# Patient Record
Sex: MALE | Race: WHITE | NOT HISPANIC OR LATINO | Employment: OTHER | URBAN - NONMETROPOLITAN AREA
[De-identification: names, ages, dates, MRNs, and addresses within clinical notes are randomized per-mention and may not be internally consistent; named-entity substitution may affect disease eponyms.]

---

## 2023-05-14 ENCOUNTER — HOSPITAL ENCOUNTER (EMERGENCY)
Facility: HOSPITAL | Age: 63
Discharge: HOME | End: 2023-05-14
Attending: EMERGENCY MEDICINE | Admitting: EMERGENCY MEDICINE
Payer: MEDICAID

## 2023-05-14 ENCOUNTER — APPOINTMENT (OUTPATIENT)
Dept: GENERAL RADIOLOGY | Facility: HOSPITAL | Age: 63
End: 2023-05-14
Payer: MEDICAID

## 2023-05-14 ENCOUNTER — APPOINTMENT (OUTPATIENT)
Dept: CT IMAGING | Facility: HOSPITAL | Age: 63
End: 2023-05-14
Payer: MEDICAID

## 2023-05-14 VITALS
WEIGHT: 225 LBS | DIASTOLIC BLOOD PRESSURE: 74 MMHG | BODY MASS INDEX: 28.88 KG/M2 | SYSTOLIC BLOOD PRESSURE: 146 MMHG | TEMPERATURE: 97.9 F | HEART RATE: 89 BPM | HEIGHT: 74 IN | OXYGEN SATURATION: 98 % | RESPIRATION RATE: 15 BRPM

## 2023-05-14 DIAGNOSIS — S76.312A STRAIN OF LEFT HAMSTRING MUSCLE, INITIAL ENCOUNTER: Primary | ICD-10-CM

## 2023-05-14 LAB
ALBUMIN SERPL-MCNC: 4.3 G/DL (ref 3.5–5.2)
ALBUMIN/GLOB SERPL: 1.2 G/DL
ALP SERPL-CCNC: 78 U/L (ref 39–117)
ALT SERPL W P-5'-P-CCNC: 43 U/L (ref 1–41)
ANION GAP SERPL CALCULATED.3IONS-SCNC: 12.8 MMOL/L (ref 5–15)
AST SERPL-CCNC: 26 U/L (ref 1–40)
BASOPHILS # BLD AUTO: 0.02 10*3/MM3 (ref 0–0.2)
BASOPHILS NFR BLD AUTO: 0.2 % (ref 0–1.5)
BILIRUB SERPL-MCNC: 1 MG/DL (ref 0–1.2)
BILIRUB UR QL STRIP: NEGATIVE
BUN SERPL-MCNC: 9 MG/DL (ref 8–23)
BUN/CREAT SERPL: 12.5 (ref 7–25)
CALCIUM SPEC-SCNC: 9.3 MG/DL (ref 8.6–10.5)
CHLORIDE SERPL-SCNC: 99 MMOL/L (ref 98–107)
CLARITY UR: CLEAR
CO2 SERPL-SCNC: 23.2 MMOL/L (ref 22–29)
COLOR UR: YELLOW
CREAT SERPL-MCNC: 0.72 MG/DL (ref 0.76–1.27)
CRP SERPL-MCNC: 7.84 MG/DL (ref 0–0.5)
DEPRECATED RDW RBC AUTO: 42 FL (ref 37–54)
EGFRCR SERPLBLD CKD-EPI 2021: 103.9 ML/MIN/1.73
EOSINOPHIL # BLD AUTO: 0.14 10*3/MM3 (ref 0–0.4)
EOSINOPHIL NFR BLD AUTO: 1.6 % (ref 0.3–6.2)
ERYTHROCYTE [DISTWIDTH] IN BLOOD BY AUTOMATED COUNT: 12.2 % (ref 12.3–15.4)
ERYTHROCYTE [SEDIMENTATION RATE] IN BLOOD: 11 MM/HR (ref 0–20)
GLOBULIN UR ELPH-MCNC: 3.5 GM/DL
GLUCOSE SERPL-MCNC: 158 MG/DL (ref 65–99)
GLUCOSE UR STRIP-MCNC: ABNORMAL MG/DL
HCT VFR BLD AUTO: 41.9 % (ref 37.5–51)
HGB BLD-MCNC: 14.4 G/DL (ref 13–17.7)
HGB UR QL STRIP.AUTO: NEGATIVE
IMM GRANULOCYTES # BLD AUTO: 0.02 10*3/MM3 (ref 0–0.05)
IMM GRANULOCYTES NFR BLD AUTO: 0.2 % (ref 0–0.5)
KETONES UR QL STRIP: NEGATIVE
LEUKOCYTE ESTERASE UR QL STRIP.AUTO: NEGATIVE
LYMPHOCYTES # BLD AUTO: 1.41 10*3/MM3 (ref 0.7–3.1)
LYMPHOCYTES NFR BLD AUTO: 15.7 % (ref 19.6–45.3)
MCH RBC QN AUTO: 32.3 PG (ref 26.6–33)
MCHC RBC AUTO-ENTMCNC: 34.4 G/DL (ref 31.5–35.7)
MCV RBC AUTO: 93.9 FL (ref 79–97)
MONOCYTES # BLD AUTO: 1 10*3/MM3 (ref 0.1–0.9)
MONOCYTES NFR BLD AUTO: 11.1 % (ref 5–12)
NEUTROPHILS NFR BLD AUTO: 6.38 10*3/MM3 (ref 1.7–7)
NEUTROPHILS NFR BLD AUTO: 71.2 % (ref 42.7–76)
NITRITE UR QL STRIP: NEGATIVE
NRBC BLD AUTO-RTO: 0 /100 WBC (ref 0–0.2)
PH UR STRIP.AUTO: 7 [PH] (ref 5–8)
PLATELET # BLD AUTO: 200 10*3/MM3 (ref 140–450)
PMV BLD AUTO: 9.5 FL (ref 6–12)
POTASSIUM SERPL-SCNC: 3.9 MMOL/L (ref 3.5–5.2)
PROT SERPL-MCNC: 7.8 G/DL (ref 6–8.5)
PROT UR QL STRIP: NEGATIVE
RBC # BLD AUTO: 4.46 10*6/MM3 (ref 4.14–5.8)
SODIUM SERPL-SCNC: 135 MMOL/L (ref 136–145)
SP GR UR STRIP: 1.01 (ref 1–1.03)
UROBILINOGEN UR QL STRIP: ABNORMAL
WBC NRBC COR # BLD: 8.97 10*3/MM3 (ref 3.4–10.8)

## 2023-05-14 PROCEDURE — 86140 C-REACTIVE PROTEIN: CPT

## 2023-05-14 PROCEDURE — 73502 X-RAY EXAM HIP UNI 2-3 VIEWS: CPT

## 2023-05-14 PROCEDURE — 72192 CT PELVIS W/O DYE: CPT

## 2023-05-14 PROCEDURE — 85652 RBC SED RATE AUTOMATED: CPT

## 2023-05-14 PROCEDURE — 85025 COMPLETE CBC W/AUTO DIFF WBC: CPT

## 2023-05-14 PROCEDURE — 80053 COMPREHEN METABOLIC PANEL: CPT

## 2023-05-14 PROCEDURE — 81003 URINALYSIS AUTO W/O SCOPE: CPT

## 2023-05-14 PROCEDURE — 99284 EMERGENCY DEPT VISIT MOD MDM: CPT

## 2023-05-14 RX ORDER — METHOCARBAMOL 500 MG/1
750 TABLET, FILM COATED ORAL ONCE
Status: COMPLETED | OUTPATIENT
Start: 2023-05-14 | End: 2023-05-14

## 2023-05-14 RX ORDER — LIDOCAINE 50 MG/G
1 PATCH TOPICAL EVERY 24 HOURS
Qty: 15 PATCH | Refills: 0 | Status: SHIPPED | OUTPATIENT
Start: 2023-05-14

## 2023-05-14 RX ORDER — METHOCARBAMOL 500 MG/1
500 TABLET, FILM COATED ORAL 4 TIMES DAILY
Qty: 15 TABLET | Refills: 0 | Status: SHIPPED | OUTPATIENT
Start: 2023-05-14

## 2023-05-14 RX ORDER — LIDOCAINE 50 MG/G
1 PATCH TOPICAL ONCE
Status: DISCONTINUED | OUTPATIENT
Start: 2023-05-14 | End: 2023-05-14 | Stop reason: HOSPADM

## 2023-05-14 RX ORDER — HYDROCODONE BITARTRATE AND ACETAMINOPHEN 5; 325 MG/1; MG/1
1 TABLET ORAL ONCE
Status: COMPLETED | OUTPATIENT
Start: 2023-05-14 | End: 2023-05-14

## 2023-05-14 RX ADMIN — LIDOCAINE 1 PATCH: 50 PATCH CUTANEOUS at 14:37

## 2023-05-14 RX ADMIN — METHOCARBAMOL 750 MG: 500 TABLET ORAL at 14:37

## 2023-05-14 RX ADMIN — HYDROCODONE BITARTRATE AND ACETAMINOPHEN 1 TABLET: 5; 325 TABLET ORAL at 14:36

## 2023-05-14 NOTE — ED PROVIDER NOTES
Subjective  History of Present Illness:    This is a 63-year-old male presents emergency room today history of diabetes hypertension hyperlipidemia who presents emergency room today for evaluation of left-sided back pain.  Does have a history of hip replacement several years ago.  Denies any overlying erythema warmth or swelling.  Denies any fevers.  Denies any IV drug use.  Denies any flank pain.  Denies any dysuria, hematuria, urinary frequency or urgency.  He denies any saddle anesthesias, urinary or fecal incontinence, or urinary retention.  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  He does report worse with movement and ambulation and that it does wax and wane and come and go.  He does report that he uses a ladder often for work.  He describes the pain as sharp, he did take some Tylenol yesterday, not much improvement in his symptoms.  Denies any nausea or vomiting.  Does report that the pain starts right above the left buttocks and wraps down slightly the lower extremity for radiation.      Nurses Notes reviewed and agree, including vitals, allergies, social history and prior medical history.     REVIEW OF SYSTEMS: All systems reviewed and not pertinent unless noted.  Review of Systems   Constitutional: Negative for fever.   Gastrointestinal: Negative for diarrhea, nausea and vomiting.   Musculoskeletal: Positive for back pain.   All other systems reviewed and are negative.      Past Medical History:   Diagnosis Date   • Diabetes mellitus    • Hyperlipidemia    • Hypertension        Allergies:    Patient has no known allergies.      History reviewed. No pertinent surgical history.      Social History     Socioeconomic History   • Marital status:    Tobacco Use   • Smoking status: Never   • Smokeless tobacco: Current     Types: Snuff   Vaping Use   • Vaping Use: Never used   Substance and Sexual Activity   • Alcohol use: Yes     Comment: social   • Drug  "use: Never   • Sexual activity: Defer         History reviewed. No pertinent family history.    Objective  Physical Exam:  /74 (BP Location: Left arm, Patient Position: Sitting)   Pulse 89   Temp 97.9 °F (36.6 °C) (Oral)   Resp 15   Ht 188 cm (74\")   Wt 102 kg (225 lb)   SpO2 98%   BMI 28.89 kg/m²      Physical Exam  Vitals and nursing note reviewed.   Constitutional:       General: He is not in acute distress.     Appearance: Normal appearance. He is normal weight. He is not ill-appearing, toxic-appearing or diaphoretic.   HENT:      Head: Normocephalic and atraumatic.      Nose: Nose normal. No congestion or rhinorrhea.      Mouth/Throat:      Pharynx: Oropharynx is clear.   Eyes:      Extraocular Movements: Extraocular movements intact.   Cardiovascular:      Pulses: Normal pulses.      Comments: Appears well perfused  Pulmonary:      Effort: Pulmonary effort is normal. No respiratory distress.   Abdominal:      General: Abdomen is flat.   Musculoskeletal:         General: Tenderness present. No swelling. Normal range of motion.      Cervical back: Normal range of motion and neck supple.      Comments: No cervical spine thoracic spine or lumbar spine tenderness.  There is tenderness palpated just at the left gluteal region.  No ASIS tenderness, no hip tenderness palpated.   Skin:     General: Skin is warm and dry.      Capillary Refill: Capillary refill takes less than 2 seconds.      Findings: No bruising, erythema or lesion.   Neurological:      General: No focal deficit present.      Mental Status: He is alert and oriented to person, place, and time.   Psychiatric:         Mood and Affect: Mood normal.         Behavior: Behavior normal.         Thought Content: Thought content normal.         Judgment: Judgment normal.             Procedures    ED Course:         Lab Results (last 24 hours)     Procedure Component Value Units Date/Time    Urinalysis With Culture If Indicated - Urine, Clean Catch " [[id number]]  (Abnormal) Collected: 05/14/23 1414    Specimen: Urine, Clean Catch Updated: 05/14/23 1423     Color, UA Yellow     Appearance, UA Clear     pH, UA 7.0     Specific Gravity, UA 1.014     Glucose,  mg/dL (2+)     Ketones, UA Negative     Bilirubin, UA Negative     Blood, UA Negative     Protein, UA Negative     Leuk Esterase, UA Negative     Nitrite, UA Negative     Urobilinogen, UA 1.0 E.U./dL    Narrative:      In absence of clinical symptoms, the presence of pyuria, bacteria, and/or nitrites on the urinalysis result does not correlate with infection.  Urine microscopic not indicated.    CBC Auto Differential [[id number]]  (Abnormal) Collected: 05/14/23 1539    Specimen: Blood Updated: 05/14/23 1549     WBC 8.97 10*3/mm3      RBC 4.46 10*6/mm3      Hemoglobin 14.4 g/dL      Hematocrit 41.9 %      MCV 93.9 fL      MCH 32.3 pg      MCHC 34.4 g/dL      RDW 12.2 %      RDW-SD 42.0 fl      MPV 9.5 fL      Platelets 200 10*3/mm3      Neutrophil % 71.2 %      Lymphocyte % 15.7 %      Monocyte % 11.1 %      Eosinophil % 1.6 %      Basophil % 0.2 %      Immature Grans % 0.2 %      Neutrophils, Absolute 6.38 10*3/mm3      Lymphocytes, Absolute 1.41 10*3/mm3      Monocytes, Absolute 1.00 10*3/mm3      Eosinophils, Absolute 0.14 10*3/mm3      Basophils, Absolute 0.02 10*3/mm3      Immature Grans, Absolute 0.02 10*3/mm3      nRBC 0.0 /100 WBC     Sedimentation Rate [[id number]]  (Normal) Collected: 05/14/23 1539    Specimen: Blood Updated: 05/14/23 1556     Sed Rate 11 mm/hr     Comprehensive Metabolic Panel [[id number]]  (Abnormal) Collected: 05/14/23 1624    Specimen: Blood Updated: 05/14/23 1656     Glucose 158 mg/dL      BUN 9 mg/dL      Creatinine 0.72 mg/dL      Sodium 135 mmol/L      Potassium 3.9 mmol/L      Chloride 99 mmol/L      CO2 23.2 mmol/L      Calcium 9.3 mg/dL      Total Protein 7.8 g/dL      Albumin 4.3 g/dL      ALT (SGPT) 43 U/L      AST (SGOT) 26 U/L      Alkaline Phosphatase 78 U/L       Total Bilirubin 1.0 mg/dL      Globulin 3.5 gm/dL      A/G Ratio 1.2 g/dL      BUN/Creatinine Ratio 12.5     Anion Gap 12.8 mmol/L      eGFR 103.9 mL/min/1.73     Narrative:      GFR Normal >60  Chronic Kidney Disease <60  Kidney Failure <15      C-reactive Protein [[id number]]  (Abnormal) Collected: 05/14/23 1624    Specimen: Blood Updated: 05/14/23 1656     C-Reactive Protein 7.84 mg/dL            No radiology results from the last 24 hrs       MDM    Initial impression of presenting illness: 63-year-old male presents emergency room today for evaluation of low back pain left-sided.    DDX: includes but is not limited to: Osseous abnormality including fracture or dislocation, sciatica, radiculopathy, musculoskeletal strain or sprain, septic arthritis, other    Patient arrives hemodynamically stable, afebrile, nontachycardic nonhypoxic and nontoxic-appearing.  With vitals interpreted by myself.     Pertinent features from physical exam:No cervical spine thoracic spine or lumbar spine tenderness.  There is tenderness palpated just at the left gluteal region.  No ASIS tenderness, no hip tenderness palpated.,  No overlying erythema warmth or swelling of the left hip.    Given his history of diabetes, pain worse with walking, believe it would be reasonable to obtain blood work and CT although he has not had any fevers or chills, is hemodynamically stable without tachycardia or on arrival.    Initial diagnostic plan: Plain film of the left hip, urinalysis, CT of the pelvis with contrast, ESR, CRP, CBC, CMP.    Results from initial plan were reviewed and interpreted by me revealing no acute abnormality of the plain film left hip as interpreted by me.  Urinalysis with glucose, otherwise unremarkable.  CT per radiology interpretation with chronic left hamstring tendinopathy otherwise unremarkable.  CMP with glucose of 158, sodium of 135, ALT of 43 otherwise unremarkable.  Sed rate normal.  CRP mildly elevated at 7.84,  nonspecific.  CT per radiology interpretation with chronic left hamstring tendinopathy otherwise negative.  Believe this correlates well with patient's palpable pain to the left gluteal region.      Diagnostic information from other sources: Chart review.    Interventions / Re-evaluation: Given Norco, Robaxin, and lidocaine patch for symptomatic treatment.  Better after interventions.  [facility] for discharge home     Given CT findings of chronic left hamstring tendinopathy with pain reproducible in that region, believe this is likely result of his findings and believe that septic arthritis is extremely less likely given mostly reassuring laboratory studies, absence of any systemic symptoms.  He was given strict return precautions though.    Results/clinical rationale were discussed with patient at bedside.    Consultations/Discussion of results with other physicians: Discussed with attending prior to discharge.    Disposition plan: Discharge home.  Follow-up outpatient with orthopedics and primary care.  Sent muscle relaxers and lidocaine patches to patient's pharmacy for further symptomatic treatment.  Did discuss signs and symptoms of septic arthritis with the patient and advised him to come back if he noticed any symptoms of this.  Recommended orthopedic follow-up and primary care follow-up.    -----    Final diagnoses:   Strain of left hamstring muscle, initial encounter        Alejandro Jaramillo PA-C  05/14/23 1910       Alejandro Jaramillo PA-C  05/14/23 1911

## 2023-05-14 NOTE — DISCHARGE INSTRUCTIONS
Return to ER for any worsening symptoms.  Suggest close follow-up with your primary care and orthopedics.  I have sent several medications your pharmacy, should pick these up and take as directed.  Additionally recommend ice and heat to the affected area.  If utilizing heating pad do not utilize for greater than 15 minutes to prevent burns to the affected area.  Number for orthopedics has been included in your discharge summary, make sure to call to schedule an appointment for further evaluation.